# Patient Record
Sex: FEMALE | Race: WHITE | NOT HISPANIC OR LATINO | URBAN - METROPOLITAN AREA
[De-identification: names, ages, dates, MRNs, and addresses within clinical notes are randomized per-mention and may not be internally consistent; named-entity substitution may affect disease eponyms.]

---

## 2023-03-09 ENCOUNTER — OFFICE (OUTPATIENT)
Dept: URBAN - METROPOLITAN AREA CLINIC 19 | Facility: CLINIC | Age: 23
End: 2023-03-09

## 2023-03-09 VITALS
BODY MASS INDEX: 30.18 KG/M2 | OXYGEN SATURATION: 96 % | SYSTOLIC BLOOD PRESSURE: 133 MMHG | HEIGHT: 62 IN | DIASTOLIC BLOOD PRESSURE: 72 MMHG | WEIGHT: 164 LBS | HEART RATE: 75 BPM

## 2023-03-09 DIAGNOSIS — R63.5 ABNORMAL WEIGHT GAIN: ICD-10-CM

## 2023-03-09 DIAGNOSIS — E73.9 LACTOSE INTOLERANCE, UNSPECIFIED: ICD-10-CM

## 2023-03-09 DIAGNOSIS — R19.7 DIARRHEA, UNSPECIFIED: ICD-10-CM

## 2023-03-09 DIAGNOSIS — R14.0 ABDOMINAL DISTENSION (GASEOUS): ICD-10-CM

## 2023-03-09 DIAGNOSIS — R53.83 OTHER FATIGUE: ICD-10-CM

## 2023-03-09 DIAGNOSIS — N73.9 FEMALE PELVIC INFLAMMATORY DISEASE, UNSPECIFIED: ICD-10-CM

## 2023-03-09 DIAGNOSIS — R11.0 NAUSEA: ICD-10-CM

## 2023-03-09 PROCEDURE — 99204 OFFICE O/P NEW MOD 45 MIN: CPT | Performed by: NURSE PRACTITIONER

## 2023-03-09 NOTE — SERVICEHPINOTES
Ms. Saez is a 22 year old female with PMH including migraine HA's, depression/anxiety, PID, ovarian cyst.
br
br   She presents to clinic today as a referral from JAMES Amador at [facility] for diarrhea, bloating.
br
brPatient states over the last 1.5 months she has had diarrhea daily, typically postprandial. Having around 5/day, liquid. Her stool frequency has actually decreased some over the last week or so. 
br
br She has bloating, fatigue, recent anorexia, nausea (subsided), intermittent left pelvic pain after intercourse. 
br
br
No NSAIDs, ASA, Excedrin.
br
br
No melena, hematochezia, hematemesis, coffee ground emesis, fever, weight loss.
br
br Baseline BM regimen is every other day, formed soft stool.br
br
No h/o IBD or colon CA in family.
br
br
She has never had an EGD or colonoscopy. 
br 
br
No recent travel, sick contacts, antibiotics, or med changes. She does have br 
br
She has been on Lexapro X 2 years.
br
br
Dairy does bother her and cause diarrhea at times.
br 
br
Records reviewed from Mai Coleman, NP. CBC, CMP, A1C, vaginitis swab, vaginal US normal.

## 2023-03-10 LAB
C-REACTIVE PROTEIN, QUANT: 10 MG/L (ref 0–10)
TSH: 1.1 UIU/ML (ref 0.45–4.5)

## 2023-03-15 LAB
